# Patient Record
Sex: MALE | Race: WHITE | NOT HISPANIC OR LATINO | URBAN - METROPOLITAN AREA
[De-identification: names, ages, dates, MRNs, and addresses within clinical notes are randomized per-mention and may not be internally consistent; named-entity substitution may affect disease eponyms.]

---

## 2023-09-21 ENCOUNTER — EMERGENCY (EMERGENCY)
Facility: HOSPITAL | Age: 23
LOS: 1 days | Discharge: HOME | End: 2023-09-21
Attending: EMERGENCY MEDICINE
Payer: SELF-PAY

## 2023-09-21 VITALS
OXYGEN SATURATION: 96 % | WEIGHT: 71.43 LBS | TEMPERATURE: 98 F | RESPIRATION RATE: 16 BRPM | HEART RATE: 77 BPM | DIASTOLIC BLOOD PRESSURE: 91 MMHG | SYSTOLIC BLOOD PRESSURE: 137 MMHG

## 2023-09-21 PROCEDURE — G1004: CPT

## 2023-09-21 PROCEDURE — T1013: CPT

## 2023-09-21 PROCEDURE — 99284 EMERGENCY DEPT VISIT MOD MDM: CPT

## 2023-09-21 PROCEDURE — 70450 CT HEAD/BRAIN W/O DYE: CPT | Mod: MF

## 2023-09-21 PROCEDURE — 99284 EMERGENCY DEPT VISIT MOD MDM: CPT | Mod: 25

## 2023-09-21 PROCEDURE — 70450 CT HEAD/BRAIN W/O DYE: CPT | Mod: 26,MF

## 2023-09-21 RX ORDER — METHOCARBAMOL 500 MG/1
2 TABLET, FILM COATED ORAL
Qty: 20 | Refills: 0
Start: 2023-09-21

## 2023-09-21 RX ORDER — IBUPROFEN 200 MG
1 TABLET ORAL
Qty: 12 | Refills: 0
Start: 2023-09-21 | End: 2023-09-23

## 2023-09-21 RX ORDER — IBUPROFEN 200 MG
600 TABLET ORAL ONCE
Refills: 0 | Status: COMPLETED | OUTPATIENT
Start: 2023-09-21 | End: 2023-09-21

## 2023-09-21 RX ADMIN — Medication 600 MILLIGRAM(S): at 09:33

## 2023-09-21 NOTE — ED STATDOCS - PHYSICAL EXAMINATION
Gen: Non-toxic appearing in NAD  ENT: swelling and erythema of left auricle with abrasion over helix  Card: regular rate and rhythm, no obvious murmur  Resp: Lungs clear annd equal bilaterally   Abd: soft, dull to percussion, non-distended, non-tender  Msk:  no midline C/T/L spin tenderness, FROM c-spine, + ttp left humerus, FROM bilateral lower extremities  Neuro:  A&Ox3, C4-T1 motor 5/5 and equal bilaterally, normal gait Gen: Non-toxic appearing in NAD  ENT: swelling and erythema of left auricle with abrasion over helix  Card: regular rate and rhythm, no obvious murmur  Resp: Lungs clear annd equal bilaterally   Abd: soft, dull to percussion, non-distended, non-tender  Msk:  no midline C/T/L spine tenderness, FROM c-spine, + ttp left humerus, FROM bilateral lower extremities  Neuro:  A&Ox3, C4-T1 motor 5/5 and equal bilaterally, normal gait

## 2023-09-21 NOTE — ED STATDOCS - PROGRESS NOTE DETAILS
Pt moved form intake Room. Pt seen and evaluated by intake Physician. HPI, Physical examination performed by intake Physician . Note reviewed and followup examination performed by me consistent with initial assessment. Agrees with intake Physician plan and tests. Head Ct normal and Pt made aware of the results. Pt D/C in stable condition with Rx sent to her Pharmacy. F/U with PCP or [facility].
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
## 2023-09-21 NOTE — ED STATDOCS - PATIENT PORTAL LINK FT
You can access the FollowMyHealth Patient Portal offered by [facility] by registering at the following website: [email or web address]. By joining [facility]’s FollowMyHealth portal, you will also be able to view your health information using other applications (apps) compatible with our system.
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
## 2023-09-21 NOTE — ED STATDOCS - NS_ ATTENDINGSCRIBEDETAILS _ED_A_ED_FT
I, Darell Desouza, performed the initial face to face bedside interview with this patient regarding history of present illness, review of symptoms and relevant past medical, social and family history.  I completed an independent physical examination.  I was the provider who initially evaluated this patient.  The history, relevant review of systems, past medical and surgical history, medical decision making, and physical examination was documented by the scribe in my presence and I attest to the accuracy of the documentation. Follow-up on ordered tests (ie labs, radiologic studies) and re-evaluation of the patient's status has been communicated to the ACP.  Disposition of the patient will be based on test outcome and response to ED interventions.
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
